# Patient Record
Sex: MALE | Race: WHITE | NOT HISPANIC OR LATINO | Employment: OTHER | URBAN - METROPOLITAN AREA
[De-identification: names, ages, dates, MRNs, and addresses within clinical notes are randomized per-mention and may not be internally consistent; named-entity substitution may affect disease eponyms.]

---

## 2019-09-28 ENCOUNTER — COMMUNICATION - HEALTHEAST (OUTPATIENT)
Dept: SCHEDULING | Facility: CLINIC | Age: 34
End: 2019-09-28

## 2021-06-01 NOTE — TELEPHONE ENCOUNTER
Pt calling that he sliced his left middle finer, with kitchen knife last evening and bleed, bleeding has stopped.  Pt wanting to know what he should do.  Pt states he is a [place] and [facility]    Felicia Flores RN, [facility] RN Triage Nurse Advisor      Reason for Disposition    [1] Deep cut AND [2] can see bone or tendons    Protocols used: CUTS AND [id number]

## 2021-12-02 ENCOUNTER — HOSPITAL ENCOUNTER (EMERGENCY)
Facility: HOSPITAL | Age: 36
Discharge: HOME | End: 2021-12-03
Attending: EMERGENCY MEDICINE | Admitting: EMERGENCY MEDICINE
Payer: COMMERCIAL

## 2021-12-02 VITALS
DIASTOLIC BLOOD PRESSURE: 74 MMHG | HEART RATE: 113 BPM | SYSTOLIC BLOOD PRESSURE: 131 MMHG | WEIGHT: 165 LBS | OXYGEN SATURATION: 98 % | RESPIRATION RATE: 20 BRPM | TEMPERATURE: 98.5 F

## 2021-12-02 DIAGNOSIS — F10.920 ALCOHOLIC INTOXICATION WITHOUT COMPLICATION (H): ICD-10-CM

## 2021-12-02 PROCEDURE — 99284 EMERGENCY DEPT VISIT MOD MDM: CPT

## 2021-12-02 RX ORDER — DIPHENHYDRAMINE HYDROCHLORIDE 50 MG/ML
25 INJECTION INTRAMUSCULAR; INTRAVENOUS ONCE
Status: DISCONTINUED | OUTPATIENT
Start: 2021-12-02 | End: 2021-12-03 | Stop reason: HOSPADM

## 2021-12-02 RX ORDER — LORAZEPAM 2 MG/ML
2 INJECTION INTRAMUSCULAR
Status: DISCONTINUED | OUTPATIENT
Start: 2021-12-02 | End: 2021-12-03 | Stop reason: HOSPADM

## 2021-12-02 RX ORDER — HALOPERIDOL 5 MG/ML
5 INJECTION INTRAMUSCULAR
Status: DISCONTINUED | OUTPATIENT
Start: 2021-12-02 | End: 2021-12-03 | Stop reason: HOSPADM

## 2021-12-03 NOTE — ED NOTES
[facility] detox [phone] will have a bed. ED staff is instructed to call back to secure a bed and give report at midnight.Pt 's Father Bandar [phone] updated re plan of care.Pt placed on a T hold.per provider.

## 2021-12-03 NOTE — ED NOTES
Bed: JNEDH-01  Expected date: 12/2/21  Expected time: 7:45 PM  Means of arrival: Ambulance  Comments:  Irene- 35 yo M ETOH
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
## 2021-12-03 NOTE — ED NOTES
Received report from Cathy. Patient is loud, using profanity and inappropriate. Gave sandwich and can of coke to try to assist in sobering up. Patient refusing vitals. Security presence at bedside.

## 2021-12-03 NOTE — ED NOTES
MD ok'd to discharge, offered cab to patient. Patient instead decided to call an uber. Security escorted him to waiting room to wait for his Uber.

## 2021-12-03 NOTE — ED PROVIDER NOTES
EMERGENCY DEPARTMENT ENCOUNTER     NAME: Mick Haq   AGE: 36 year old male   YOB: 1985   MRN: [id number]   EVALUATION DATE & TIME: 12/2/2021  7:46 PM   PCP: No primary care provider on file.     Chief Complaint   Patient presents with     Alcohol Intoxication   :    FINAL IMPRESSION       1. Alcoholic intoxication without complication (H)           ED COURSE & MEDICAL DECISION MAKING      Pertinent Labs & Imaging studies reviewed. (See chart for details)   36 year old male  presents to the Emergency Department for evaluation of alcohol intoxication. Initial Vitals Reviewed. Initial exam notable for patient who smells of alcohol, is intoxicated and only oriented to self, but is otherwise alert, awake, and admits to drinking a large amount of alcohol today.  He denies any other medical concerns.  Per EMS, he blew a 0.296 which is consistent with alcohol intoxication.  He was able to give us a phone number for his father, and unfortunately his dad is in [place] and would not be able to come pick him up.  Therefore, he will be transferred to [facility] for the evening.        8:04 PM I met the patient and performed my initial interview and exam.     At the conclusion of the encounter I discussed the results of all of the tests and the disposition. The questions were answered. The patient or family acknowledged understanding and was agreeable with the care plan.         MEDICATIONS GIVEN IN THE EMERGENCY:   Medications - No data to display   NEW PRESCRIPTIONS STARTED AT TODAY'S ER VISIT   New Prescriptions    No medications on file     ================================================================   HISTORY OF PRESENT ILLNESS       Patient information was obtained from: patient    Use of Intrepreter: N/A   Mick Haq is a 36 year old male with no medical history on file who presents for evaluation of intoxication.     History limited secondary to patient intoxication.     Patient  "reports he was quote \"trying to be good\". He does not know where he is or how he got to the ED.     He blew a 0.296 for EMS.     ================================================================    REVIEW OF SYSTEMS       Review of Systems   Unable to perform ROS: Other (Alcohol Intoxication)        PAST HISTORY     PAST MEDICAL HISTORY:   History reviewed. No pertinent past medical history.   PAST SURGICAL HISTORY:   History reviewed. No pertinent surgical history.   CURRENT MEDICATIONS:   No current outpatient medications on file.    ALLERGIES:   No Known Allergies   FAMILY HISTORY:   History reviewed. No pertinent family history.   SOCIAL HISTORY:   Social History     Socioeconomic History     Marital status: Not on file     Spouse name: Not on file     Number of children: Not on file     Years of education: Not on file     Highest education level: Not on file   Occupational History     Not on file   Tobacco Use     Smoking status: Not on file     Smokeless tobacco: Not on file   Substance and Sexual Activity     Alcohol use: Not on file     Drug use: Not on file     Sexual activity: Not on file   Other Topics Concern     Not on file   Social History Narrative     Not on file     Social Determinants of Health     Financial Resource Strain: Not on file   Food Insecurity: Not on file   Transportation Needs: Not on file   Physical Activity: Not on file   Stress: Not on file   Social Connections: Not on file   Intimate Partner Violence: Not on file   Housing Stability: Not on file        VITALS  Patient Vitals for the past 24 hrs:   BP Temp Temp src Pulse Resp SpO2 Weight   12/02/21 1950 131/74 98.5  F (36.9  C) Oral 113 20 98 % 74.8 kg (165 lb)        ================================================================    PHYSICAL EXAM     VITAL SIGNS: /74   Pulse 113   Temp 98.5  F (36.9  C) (Oral)   Resp 20   Wt 74.8 kg (165 lb)   SpO2 98%    Constitutional:  Patient is intoxicated.   HENT:  Atraumatic, " oropharynx without exudate or erythema, membranes moist  Lymph:  No adenopathy  Eyes: EOM intact, PERRL, no injection  Neck: Supple  Respiratory:  Clear to auscultation bilaterally, no wheezes or crackles   Cardiovascular:  Regular rate and rhythm, single S1 and S2   GI:  Soft, nontender, nondistended, no rebound or guarding   Musculoskeletal:  Moves all extremities, no lower extremity edema, no deformities    Skin:  Warm, dry  Neurologic:  Alert, oriented only to self, no focal deficits noted       ================================================================  LAB       All pertinent labs reviewed and interpreted.   Labs Ordered and Resulted from Time of ED Arrival to Time of ED Departure - No data to display     ===============================================================  RADIOLOGY       Reviewed all pertinent imaging. Please see official radiology report.   No orders to display         ================================================================  EKG         I have independently reviewed and interpreted the EKG(s) documented above.     ================================================================  PROCEDURES         I, Keny Monae, am serving as a scribe to document services personally performed by Dr. Chilel based on my observation and the provider's statements to me. I, Barbara Chilel MD attest that Keny Monae is acting in a scribe capacity, has observed my performance of the services and has documented them in accordance with my direction.   Barbara Chilel M.D.   Emergency Medicine   [facility] EMERGENCY DEPARTMENT  [facility] [facility] [id number] [phone]  Dept: [phone]      Barbara Chilel MD  12/02/21 0108

## 2021-12-03 NOTE — ED PROVIDER NOTES
"EMERGENCY DEPARTMENT PROGRESS NOTE         ED COURSE AND MEDICAL DECISION MAKING  Patient was signed out to me by Dr. Barbara Chilel at 10:05 PM.      Mick Haq is a 36 year old male who presents with alcohol intoxication.    History limited secondary to patient intoxication.      Patient reports he was quote \"trying to be good\". He does not know where he is or how he got to the ED.      He blew a 0.296 for EMS.     Just prior to signout, the patient was starting to be a bit more confrontational with nursing staff so security was called to the bedside to help to verbally redirect the patient.  He does still appear intoxicated and is not able to leave unless he has a sober person come to collect him.  Plan currently is for him to go to [facility] detox on a transport hold.    12:21 AM patient does not wish to go to detox. He has been awake, intermittently standing and walking stable on his feet, and talking quite avidly with the security guards were present at patient bedside for the last 2-1/2 hours of my shift. He does seem to be clearing. He now is able to problem solve how he would get into his house, and does wish to be discharged. He is concerned about tomorrow morning and making class. He is planning to call in [facility] and will not drive home. Clinically he has sobered.  At this point he is able to make decisions for himself and does have decision-making capability and I do not feel that I can hold him against his will in the emergency department any longer. Patient was discharged at his request and was ambulatory out of the department.    Medications - No data to display  New Prescriptions    No medications on file       LAB  Pertinent labs results reviewed   [unfilled]      RADIOLOGY    Pertinent imaging reviewed   Please see official radiology report.  No orders to display       FINAL IMPRESSION    1. Alcoholic intoxication without complication (H)         I, Kate Abarca, am serving as a " scribe to document services personally performed by Dr. Triny Rodriguez, based on my observations and the provider's statements to me.    I, Dr. Rodriguez, attest that Kate Abarca is acting in a scribe capacity, has observed my performance of the services and has documented them in accordance with my direction.        Triny Rodriguez MD  12/02/21 2217       Triny Rodriguez MD  12/03/21 0022

## 2022-03-05 ENCOUNTER — HOSPITAL ENCOUNTER (EMERGENCY)
Facility: HOSPITAL | Age: 37
Discharge: HOME | End: 2022-03-05
Attending: STUDENT IN AN ORGANIZED HEALTH CARE EDUCATION/TRAINING PROGRAM | Admitting: STUDENT IN AN ORGANIZED HEALTH CARE EDUCATION/TRAINING PROGRAM
Payer: COMMERCIAL

## 2022-03-05 ENCOUNTER — APPOINTMENT (OUTPATIENT)
Dept: RADIOLOGY | Facility: HOSPITAL | Age: 37
End: 2022-03-05
Payer: COMMERCIAL

## 2022-03-05 VITALS
HEIGHT: 67 IN | BODY MASS INDEX: 24.33 KG/M2 | RESPIRATION RATE: 20 BRPM | DIASTOLIC BLOOD PRESSURE: 61 MMHG | SYSTOLIC BLOOD PRESSURE: 101 MMHG | OXYGEN SATURATION: 95 % | TEMPERATURE: 99.4 F | HEART RATE: 85 BPM | WEIGHT: 155 LBS

## 2022-03-05 DIAGNOSIS — R00.0 TACHYCARDIA: ICD-10-CM

## 2022-03-05 DIAGNOSIS — F10.929 ALCOHOL INTOXICATION (H): ICD-10-CM

## 2022-03-05 LAB
ANION GAP SERPL CALCULATED.3IONS-SCNC: 20 MMOL/L (ref 5–18)
BUN SERPL-MCNC: 9 MG/DL (ref 8–22)
CALCIUM SERPL-MCNC: 7.8 MG/DL (ref 8.5–10.5)
CHLORIDE BLD-SCNC: 105 MMOL/L (ref 98–107)
CO2 SERPL-SCNC: 18 MMOL/L (ref 22–31)
CREAT SERPL-MCNC: 0.68 MG/DL (ref 0.7–1.3)
ERYTHROCYTE [DISTWIDTH] IN BLOOD BY AUTOMATED COUNT: 13.1 % (ref 10–15)
ETHANOL SERPL-MCNC: 193 MG/DL
GFR SERPL CREATININE-BSD FRML MDRD: >90 ML/MIN/1.73M2
GLUCOSE BLD-MCNC: 87 MG/DL (ref 70–125)
HCT VFR BLD AUTO: 44.2 % (ref 40–53)
HGB BLD-MCNC: 14.8 G/DL (ref 13.3–17.7)
HOLD SPECIMEN: NORMAL
MCH RBC QN AUTO: 31.9 PG (ref 26.5–33)
MCHC RBC AUTO-ENTMCNC: 33.5 G/DL (ref 31.5–36.5)
MCV RBC AUTO: 95 FL (ref 78–100)
PLATELET # BLD AUTO: 277 10E3/UL (ref 150–450)
POTASSIUM BLD-SCNC: 3.8 MMOL/L (ref 3.5–5)
RBC # BLD AUTO: 4.64 10E6/UL (ref 4.4–5.9)
SODIUM SERPL-SCNC: 143 MMOL/L (ref 136–145)
TROPONIN I SERPL-MCNC: <0.01 NG/ML (ref 0–0.29)
TSH SERPL DL<=0.005 MIU/L-ACNC: 1.36 UIU/ML (ref 0.3–5)
WBC # BLD AUTO: 5 10E3/UL (ref 4–11)

## 2022-03-05 PROCEDURE — 85027 COMPLETE CBC AUTOMATED: CPT | Performed by: PHYSICIAN ASSISTANT

## 2022-03-05 PROCEDURE — 84484 ASSAY OF TROPONIN QUANT: CPT | Performed by: PHYSICIAN ASSISTANT

## 2022-03-05 PROCEDURE — 36415 COLL VENOUS BLD VENIPUNCTURE: CPT | Performed by: PHYSICIAN ASSISTANT

## 2022-03-05 PROCEDURE — 96360 HYDRATION IV INFUSION INIT: CPT

## 2022-03-05 PROCEDURE — 82310 ASSAY OF CALCIUM: CPT | Performed by: PHYSICIAN ASSISTANT

## 2022-03-05 PROCEDURE — 82077 ASSAY SPEC XCP UR&BREATH IA: CPT | Performed by: PHYSICIAN ASSISTANT

## 2022-03-05 PROCEDURE — 71046 X-RAY EXAM CHEST 2 VIEWS: CPT

## 2022-03-05 PROCEDURE — 93005 ELECTROCARDIOGRAM TRACING: CPT | Performed by: STUDENT IN AN ORGANIZED HEALTH CARE EDUCATION/TRAINING PROGRAM

## 2022-03-05 PROCEDURE — 99285 EMERGENCY DEPT VISIT HI MDM: CPT | Mod: 25

## 2022-03-05 PROCEDURE — 84443 ASSAY THYROID STIM HORMONE: CPT | Performed by: PHYSICIAN ASSISTANT

## 2022-03-05 PROCEDURE — 258N000003 HC RX IP 258 OP 636: Performed by: PHYSICIAN ASSISTANT

## 2022-03-05 RX ADMIN — SODIUM CHLORIDE 1000 ML: 9 INJECTION, SOLUTION INTRAVENOUS at 10:37

## 2022-03-05 ASSESSMENT — ENCOUNTER SYMPTOMS
PALPITATIONS: 1
DIZZINESS: 0
NAUSEA: 0
ABDOMINAL PAIN: 0
LIGHT-HEADEDNESS: 1
SHORTNESS OF BREATH: 0

## 2022-03-05 NOTE — DISCHARGE INSTRUCTIONS
Your labs are reassuring.  Make sure you are drinking plenty of water to stay hydrated.  Try to stay away from alcohol.    Follow up in clinic this week for re-check.  Return to the emergency department if you develop worsening heart racing/tachycardia, shortness of breath, chest pain, headaches, or any other concerning symptoms. We would be happy to see you.

## 2022-03-05 NOTE — ED PROVIDER NOTES
EMERGENCY DEPARTMENT ENCOUNTER      NAME: Mick Haq  AGE: 36 year old male  YOB: 1985  MRN: [id number]  EVALUATION DATE & TIME: 3/5/2022 10:11 AM    PCP: System, Provider Not In    ED PROVIDER: Nan Sweeney PA-C      Chief Complaint   Patient presents with     Tachycardia         FINAL IMPRESSION:  1. Tachycardia    2. Alcohol intoxication (H)          ED COURSE & MEDICAL DECISION MAKING:    10:17 AM I introduced myself to patient, performed initial HPI and examination.   1:18 PM Talked about results. Discussed plan for discharge.       36 year old male with PMH bipolar, cocaine dependence, amphetamine dependence presents to the Emergency Department for evaluation of tachycardia. Patient was sitting at home watching a movie, felt his heart rate and noticed it was persistently elevated for 2-3 hours, prompting EMS being called. Per EMS patient was 140s upon their arrival but in the , mostly 80s. Patient does report alcohol use prior to arrival. Reports daily marijuana but otherwise no drug use. Patient is not interested in detox or any resources at this time.     Patient does have slightly pressured speech, anxious appearing and frequently fidgeting or moving legs. Unsure if this is his bipolar history (although he denies any history of this, not on medicines) or if this is additional substance abuse (again with a history of cocaine and amphetamine use). Patient is stable however and cohesive with no evidence of acute psychosis requiring further mental health/psych evaluation.   No pleuritic chest pain or other symptoms/risk factors for pulmonary embolism. Tachycardia did rapidly improve, no hypoxia and pulmonary embolism highly unlikely. No further work-up obtained at this time. EKG with sinus tachycardia but no SVT, atrial fibrillation or other arrhythmia. No ischemic changes. Labs with no leukocytosis, no significant anemia. No electrolyte derangement. TSH WNL. Troponin is  negative. ETOH 193. CXR unremarkable.     Patient was given IV fluids, observed in the ED. Patient is stable, no evidence of withdrawal, persistently with normal HR and vitals. At this time work up is reassuring. Encouraged PO hydration, cessation of alcohol, close follow up with PCP and red flags/indications to return to the emergency department. All questions were answered to the best of my ability and patient is agreeable with plan.       MEDICATIONS GIVEN IN THE EMERGENCY:  Medications   0.9% sodium chloride BOLUS (1,000 mLs Intravenous New Bag 3/5/22 1037)       NEW PRESCRIPTIONS STARTED AT TODAY'S ER VISIT  [unfilled]       =================================================================    HPI    Patient information was obtained from: Patient    Use of : N/A        Mick HERRING Eun is a 36 year old male with a pertinent history of alcohol withdrawal who presents to this ED via private car for evaluation of tachycardia.     Patient reports feeling his heart beating abnormally fast for the past 3-4 hours(~0700), adding that he does not have any chest pain or shortness of breath. He endorses lightheadedness but nothing else. No history of blood clots. No recent travel or hospitalizations. No history of diabetes, arrhythmias, heart disease, thyroid abnormalities. Patient reports daily marijuana and alcohol use. Endorses experiencing alcohol withdrawals typically 12 hours after cessation of alcohol. No other drug use. No dizziness, no recent falls. No syncope. No abdominal pain, nausea, leg pain or swelling, or any other symptoms at this time.      REVIEW OF SYSTEMS   Review of Systems   Respiratory: Negative for shortness of breath.    Cardiovascular: Positive for palpitations. Negative for chest pain and leg swelling.   Gastrointestinal: Negative for abdominal pain and nausea.   Neurological: Positive for light-headedness. Negative for dizziness and syncope.   All other systems reviewed and  "are negative.      PAST MEDICAL HISTORY:  History reviewed. No pertinent past medical history.    PAST SURGICAL HISTORY:  History reviewed. No pertinent surgical history.    CURRENT MEDICATIONS:    No current outpatient medications on file.      ALLERGIES:  Allergies   Allergen Reactions     Lactose        FAMILY HISTORY:  No family history on file.    SOCIAL HISTORY:   Social History     Socioeconomic History     Marital status: Single     Spouse name: Not on file     Number of children: Not on file     Years of education: Not on file     Highest education level: Not on file   Occupational History     Not on file   Tobacco Use     Smoking status: Not on file     Smokeless tobacco: Not on file   Substance and Sexual Activity     Alcohol use: Yes     Drug use: Not on file     Sexual activity: Not on file   Other Topics Concern     Not on file   Social History Narrative     Not on file     Social Determinants of Health     Financial Resource Strain: Not on file   Food Insecurity: Not on file   Transportation Needs: Not on file   Physical Activity: Not on file   Stress: Not on file   Social Connections: Not on file   Intimate Partner Violence: Not on file   Housing Stability: Not on file       VITALS:  /55   Pulse 96   Temp 99.2  F (37.3  C) (Oral)   Resp 23   Ht 1.702 m (5' 7\")   Wt 70.3 kg (155 lb)   SpO2 97%   BMI 24.28 kg/m      PHYSICAL EXAM    Constitutional: Well developed, Well nourished, NAD, GCS 15   HENT: Normocephalic, Atraumatic, Oropharynx clear.   Neck- Supple, Nontender. Normal ROM. No stridor.  Eyes: Conjunctiva normal. PERRL. EOM intact.   Respiratory: No respiratory distress, speaking in full sentences. Normal breath sounds, No wheezing  Cardiovascular: Normal heart rate, Regular rhythm, No murmurs. Chest wall nontender.    GI: Soft, nontender.   Musculoskeletal: No deformities, Moves all extremities equally. No calf tenderness or swelling.  Integument: Warm, Dry, No erythema, " ecchymosis, or rash.   Neurologic: Alert & oriented x 3, Normal sensory function. No focal deficits.   Psychiatric: Affect normal. Slightly pressured speech, fidgety and frequently moving his legs.      LAB:  All pertinent labs reviewed and interpreted.  Results for orders placed or performed during the hospital encounter of 03/05/22   CBC (+ platelets, no diff)   Result Value Ref Range    WBC Count 5.0 4.0 - 11.0 10e3/uL    RBC Count 4.64 4.40 - 5.90 10e6/uL    Hemoglobin 14.8 13.3 - 17.7 g/dL    Hematocrit 44.2 40.0 - 53.0 %    MCV 95 78 - 100 fL    MCH 31.9 26.5 - 33.0 pg    MCHC 33.5 31.5 - 36.5 g/dL    RDW 13.1 10.0 - 15.0 %    Platelet Count 277 150 - 450 10e3/uL   Basic metabolic panel   Result Value Ref Range    Sodium 143 136 - 145 mmol/L    Potassium 3.8 3.5 - 5.0 mmol/L    Chloride 105 98 - 107 mmol/L    Carbon Dioxide (CO2) 18 (L) 22 - 31 mmol/L    Anion Gap 20 (H) 5 - 18 mmol/L    Urea Nitrogen 9 8 - 22 mg/dL    Creatinine 0.68 (L) 0.70 - 1.30 mg/dL    Calcium 7.8 (L) 8.5 - 10.5 mg/dL    Glucose 87 70 - 125 mg/dL    GFR Estimate >90 >60 mL/min/1.73m2   Troponin I (now)   Result Value Ref Range    Troponin I <0.01 0.00 - 0.29 ng/mL   Alcohol level blood   Result Value Ref Range    Alcohol, Blood 193 (H) None detected mg/dL       RADIOLOGY:  Reviewed all pertinent imaging. Please see official radiology report.  Chest XR,  PA & LAT    (Results Pending)       EKG:    Performed at: 10:18:23    Impression: Sinus tachycardia    Rate: 104 BPM  AR Interval: 124 ms  QRS Interval: 80 ms  QTc Interval: 342/449 ms  ST Changes: None  Comparison: None    Dr. Pelayo and JENNIFFER have independently reviewed and interpreted the EKG(s) documented above.    PROCEDURES:   None      JENNIFFER, Ricco Nobles, am serving as a scribe to document services personally performed by Nan Sweeney PA-C based on my observation and the provider's statements to me. I, Nan Sweeney PA-C, attest that Ricco Nobles is acting in a scribe  capacity, has observed my performance of the services and has documented them in accordance with my direction.    Nan Sweeney PA-C  Emergency Medicine  [facility] EMERGENCY DEPARTMENT  [facility] [facility] [id number] [phone]             Nan Sweeney PA-C  03/05/22 3526

## 2022-03-05 NOTE — ED NOTES
Bed: JNED-08  Expected date: 3/5/22  Expected time:   Means of arrival: Ambulance  Comments:  Scott  36 M  tachycardia

## 2022-03-05 NOTE — ED TRIAGE NOTES
"Patient called EMS due to feeling heart racing, checked pulse and tachycardic.  Did drink alcohol last night into the morning.  HR 140s on arrival per EMS improved to 120s-130s.  VSS otherwise.  Pt reports feeling \"fuzzy\" cognitiviely on arrival, speaking with ease and makes conversation, pleasant.  No other pain or concerns. Smokes marijuana daily.   "

## 2022-03-05 NOTE — ED NOTES
Patient ready for discharge, has dressed self steady on feet and removed monitoring, good spirit and makes conversation.  Relaxed and reports feeling better, plans to go eat, plans to meet up with friend who will drive.  No chest pain, tachycardia or JETT reported with activity, no fogginess reported.  States understanding to discharge instructions with no further questions or needs.  All belongings sent at exit. Thanks for care provided.

## 2022-03-06 LAB
ATRIAL RATE - MUSE: 104 BPM
DIASTOLIC BLOOD PRESSURE - MUSE: 67 MMHG
INTERPRETATION ECG - MUSE: NORMAL
P AXIS - MUSE: 76 DEGREES
PR INTERVAL - MUSE: 124 MS
QRS DURATION - MUSE: 80 MS
QT - MUSE: 342 MS
QTC - MUSE: 449 MS
R AXIS - MUSE: 83 DEGREES
SYSTOLIC BLOOD PRESSURE - MUSE: 137 MMHG
T AXIS - MUSE: 49 DEGREES
VENTRICULAR RATE- MUSE: 104 BPM

## 2022-03-13 ENCOUNTER — HOSPITAL ENCOUNTER (EMERGENCY)
Facility: HOSPITAL | Age: 37
Discharge: HOME | End: 2022-03-13
Admitting: PHYSICIAN ASSISTANT
Payer: COMMERCIAL

## 2022-03-13 VITALS
DIASTOLIC BLOOD PRESSURE: 75 MMHG | BODY MASS INDEX: 24.28 KG/M2 | TEMPERATURE: 97.9 F | HEART RATE: 85 BPM | RESPIRATION RATE: 20 BRPM | OXYGEN SATURATION: 99 % | WEIGHT: 155 LBS | SYSTOLIC BLOOD PRESSURE: 112 MMHG

## 2022-03-13 DIAGNOSIS — F10.20 ALCOHOL DEPENDENCE (H): ICD-10-CM

## 2022-03-13 DIAGNOSIS — R00.0 TACHYCARDIA: ICD-10-CM

## 2022-03-13 PROBLEM — F10.10 NONDEPENDENT ALCOHOL ABUSE: Status: ACTIVE | Noted: 2022-03-13

## 2022-03-13 PROBLEM — F12.20 CANNABIS DEPENDENCE (H): Status: ACTIVE | Noted: 2022-03-13

## 2022-03-13 PROBLEM — F15.20 AMPHETAMINE AND PSYCHOSTIMULANT DEPENDENCE (H): Status: ACTIVE | Noted: 2022-03-13

## 2022-03-13 PROBLEM — F31.9 BIPOLAR DISORDER (H): Status: ACTIVE | Noted: 2022-03-13

## 2022-03-13 PROBLEM — F14.20 COCAINE DEPENDENCE (H): Status: ACTIVE | Noted: 2022-03-13

## 2022-03-13 LAB
ANION GAP SERPL CALCULATED.3IONS-SCNC: 9 MMOL/L (ref 5–18)
BUN SERPL-MCNC: 11 MG/DL (ref 8–22)
CALCIUM SERPL-MCNC: 8.3 MG/DL (ref 8.5–10.5)
CHLORIDE BLD-SCNC: 110 MMOL/L (ref 98–107)
CO2 SERPL-SCNC: 25 MMOL/L (ref 22–31)
CREAT SERPL-MCNC: 0.78 MG/DL (ref 0.7–1.3)
ERYTHROCYTE [DISTWIDTH] IN BLOOD BY AUTOMATED COUNT: 13.3 % (ref 10–15)
GFR SERPL CREATININE-BSD FRML MDRD: >90 ML/MIN/1.73M2
GLUCOSE BLD-MCNC: 93 MG/DL (ref 70–125)
HCT VFR BLD AUTO: 42.1 % (ref 40–53)
HGB BLD-MCNC: 14.1 G/DL (ref 13.3–17.7)
MCH RBC QN AUTO: 32.2 PG (ref 26.5–33)
MCHC RBC AUTO-ENTMCNC: 33.5 G/DL (ref 31.5–36.5)
MCV RBC AUTO: 96 FL (ref 78–100)
PLATELET # BLD AUTO: 223 10E3/UL (ref 150–450)
POTASSIUM BLD-SCNC: 3.9 MMOL/L (ref 3.5–5)
RBC # BLD AUTO: 4.38 10E6/UL (ref 4.4–5.9)
SODIUM SERPL-SCNC: 144 MMOL/L (ref 136–145)
TROPONIN I SERPL-MCNC: <0.01 NG/ML (ref 0–0.29)
WBC # BLD AUTO: 3.3 10E3/UL (ref 4–11)

## 2022-03-13 PROCEDURE — 250N000011 HC RX IP 250 OP 636: Performed by: PHYSICIAN ASSISTANT

## 2022-03-13 PROCEDURE — 36415 COLL VENOUS BLD VENIPUNCTURE: CPT | Performed by: PHYSICIAN ASSISTANT

## 2022-03-13 PROCEDURE — 93005 ELECTROCARDIOGRAM TRACING: CPT | Performed by: STUDENT IN AN ORGANIZED HEALTH CARE EDUCATION/TRAINING PROGRAM

## 2022-03-13 PROCEDURE — 99285 EMERGENCY DEPT VISIT HI MDM: CPT | Mod: 25

## 2022-03-13 PROCEDURE — 96361 HYDRATE IV INFUSION ADD-ON: CPT

## 2022-03-13 PROCEDURE — 96374 THER/PROPH/DIAG INJ IV PUSH: CPT

## 2022-03-13 PROCEDURE — 85027 COMPLETE CBC AUTOMATED: CPT | Performed by: PHYSICIAN ASSISTANT

## 2022-03-13 PROCEDURE — 82310 ASSAY OF CALCIUM: CPT | Performed by: PHYSICIAN ASSISTANT

## 2022-03-13 PROCEDURE — 84484 ASSAY OF TROPONIN QUANT: CPT | Performed by: PHYSICIAN ASSISTANT

## 2022-03-13 PROCEDURE — 258N000003 HC RX IP 258 OP 636: Performed by: PHYSICIAN ASSISTANT

## 2022-03-13 RX ORDER — DIAZEPAM 10 MG/2ML
10 INJECTION, SOLUTION INTRAMUSCULAR; INTRAVENOUS ONCE
Status: COMPLETED | OUTPATIENT
Start: 2022-03-13 | End: 2022-03-13

## 2022-03-13 RX ADMIN — SODIUM CHLORIDE 1000 ML: 9 INJECTION, SOLUTION INTRAVENOUS at 10:17

## 2022-03-13 RX ADMIN — DIAZEPAM 10 MG: 5 INJECTION, SOLUTION INTRAMUSCULAR; INTRAVENOUS at 10:17

## 2022-03-13 ASSESSMENT — ENCOUNTER SYMPTOMS
LIGHT-HEADEDNESS: 1
NAUSEA: 0
PALPITATIONS: 1
CHILLS: 0
FEVER: 0

## 2022-03-13 NOTE — ED TRIAGE NOTES
Patient arrives with concerns for his heart rate. Patient states he woke up and felt his heart racing. States he was here last week for the same concerns. Patient thinks this may be due to alcohol use and marijuana use. States he took 10-14 shots of alcohol last night around 2100.

## 2022-03-13 NOTE — ED NOTES
"While reviewing discharge paperwork, pt asked if the provider \"has anymore of that Valium for me? That stuff is great. It doesn't really make you feel high, but it makes everything feel okay. What dose was that?\" RN educated pt he received 10 mg IV Valium and that he was not being discharged with a prescription. Pt stated, \"Good to know, I'll keep that dose in mind.\" RN discussed the dangers of obtaining medications off the street and especially mixing them with alcohol. Pt laughed and said with a sarcastic tone, \"No, I would never do that.\" Pt then talked about how he used to \"chew a Xanax bar and drink a 6 pack,\" but he had to quit doing that because \"the [employer] frowned on that.\" [facility]  "

## 2022-03-13 NOTE — DISCHARGE INSTRUCTIONS
Your labs are reassuring.  Your heart rate is improved.  Continue to try to stay away from alcohol.     I have placed a referral for follow up with our rapid access cardiology clinic. They will probably want to do a holter/cardiac monitor to try to catch these episodes of tachycardia/fast heart rate. They should be calling you tomorrow to schedule follow up.     Make sure you are drinking plenty of water to stay hydrated.  Avoid caffeine and smoking.     Return to the emergency department if you develop any new/worsening symptoms. We would be happy to see you.

## 2022-03-13 NOTE — ED PROVIDER NOTES
EMERGENCY DEPARTMENT ENCOUNTER      NAME: Mick Haq  AGE: 36 year old male  YOB: 1985  MRN: [id number]  EVALUATION DATE & TIME: No admission date for patient encounter.    PCP: No Ref-Primary, Physician    ED PROVIDER: aNn Sweeney PA-C      Chief Complaint   Patient presents with     Tachycardia     Alcohol Problem         FINAL IMPRESSION:  1. Tachycardia    2. Alcohol dependence (H)          ED COURSE & MEDICAL DECISION MAKIN:50 AM I introduced myself to patient, performed initial HPI and examination.   11:20 AM Discussed plan for discharge.     36 year old male with PMH bipolar disorder, alcohol dependence presents to the Emergency Department for evaluation of tachycardia.  Patient reports he woke up this morning, heart rates in the 140s for about an hour prompting evaluation.  Does report some lightheadedness, intermittent chest pain.  No shortness of breath.  No other associated symptoms.  Last drink was at approximately 10 PM last night.  Patient does feel some early withdrawal signs.  No history of alcohol withdrawal seizures.    Patient was seen by me 3/5/22 (last week) for similar symptoms, work-up was unremarkable and patient was up to mentally discharge.  Patient states she he had not had any other episodes until today.    Upon arrival, patient is mildly tachycardic with this resolved at time of examination.  EKG with normal sinus rhythm.  Labs including CBC, BMP and troponin are negative.    At this time, no evidence of atrial fibrillation, supraventricular tachycardia, or any other arrhythmias.  Work-up is reassuring.  Patient is well-appearing, does report some early withdrawal symptoms but examination grossly unremarkable.  Does not appear acutely intoxicated.    Patient was given IV fluids, Valium.  Reassessed, HR remains <100 bpm in the ED. plan for discharge.  Patient already has established with [facility] to try for alcohol cessation.  Patient was offered  detox, but declines.  Given that this is a second episode in a little over a week with noted heart rates in the 140s, plan for referral for rapid access cardiology clinic to discuss cardiac monitoring and further evaluation.  [facility] does [facility].  Instructed on at home management, close follow-up, and red flags/locations to return to the emergency department.  All questions were answered the best my ability patient is agreeable with plan.        MEDICATIONS GIVEN IN THE EMERGENCY:  Medications   0.9% sodium chloride BOLUS (1,000 mLs Intravenous New Bag 3/13/22 1017)   diazepam (VALIUM) injection 10 mg (10 mg Intravenous Given 3/13/22 1017)       NEW PRESCRIPTIONS STARTED AT TODAY'S ER VISIT  [unfilled]       =================================================================    HPI    Patient information was obtained from: the patient     Use of : N/A         Mick Haq is a 36 year old male with a pertinent history of alcohol abuse who presents to this ED via walk in for evaluation of tachycardia and an alcohol problem.     Patient reports he woke up this morning and heart was racing. Checked his heart rate and he says it was 130-140s for approximately 1 hour. Feels intermittent chest pain which he states feels more like muscle pain. Intermittent lightheadedness. No fevers,chills, nausea.     Patient does report heavy ETOH use and dependence. Last drink at approximately 10pm last night, none this morning. Reports he does feel like he is starting withdrawal, feels itchy. Has felt like this for approximately 3 hours. No history of ETOH withdrawal.    Has previously done AA. Has a calendar which he checks off every day he is sober. Signed up for [facility] yesterday.         REVIEW OF SYSTEMS   Review of Systems   Constitutional: Negative for chills and fever.        Positive for alcohol use and feeling itchy    Cardiovascular: Positive for chest pain (intermittent) and palpitations.   Gastrointestinal: Negative for nausea.   Neurological: Positive for light-headedness (intermittent).   All other systems reviewed and are negative.      PAST MEDICAL HISTORY:  History reviewed. No pertinent past medical history.    PAST SURGICAL HISTORY:  History reviewed. No pertinent surgical history.    CURRENT MEDICATIONS:    No current outpatient medications on file.      ALLERGIES:  Allergies   Allergen Reactions     Lactose        FAMILY HISTORY:  History reviewed. No pertinent family history.    SOCIAL HISTORY:   Social History     Socioeconomic History     Marital status: Single     Spouse name: Not on file     Number of children: Not on file     Years of education: Not on file     Highest education level: Not on file   Occupational History     Not on file   Tobacco Use     Smoking status: Current Every Day Smoker     Smokeless tobacco: Not on file   Substance and Sexual Activity     Alcohol use: Yes     Drug use: Yes     Types: Marijuana     Sexual activity: Not on file   Other Topics Concern     Not on file   Social History Narrative     Not on file     Social Determinants of Health     Financial Resource Strain: Not on file   Food Insecurity: Not on file   Transportation Needs: Not on file   Physical Activity: Not on file   Stress: Not on file   Social Connections: Not on file   Intimate Partner Violence: Not on file   Housing Stability: Not on file       VITALS:  /63   Pulse 75   Temp 97.9  F (36.6  C) (Temporal)   Resp 18   Wt 70.3 kg (155 lb)   SpO2 99%   BMI 24.28 kg/m      PHYSICAL EXAM    Constitutional: Well developed, Well nourished, NAD, GCS 15   HENT: Normocephalic, Atraumatic, Oropharynx clear.   Neck- Supple, Nontender. Normal ROM. No stridor.  Eyes: Conjunctiva normal.   Respiratory: No respiratory distress, speaking in full sentences. Normal breath sounds, No wheezing, No cough  Cardiovascular: Normal  heart rate, Regular rhythm, No murmurs.    GI: Soft, nontender.    Musculoskeletal: No deformities, Moves all extremities equally. No calf tenderness or swelling.  Integument: Warm, Dry, No erythema, ecchymosis, or rash.  Neurologic: Alert & oriented x 3, Normal sensory function. No focal deficits.   Psychiatric: Affect normal, Judgment normal, Mood normal. Cooperative. No tremor.      LAB:  All pertinent labs reviewed and interpreted.  Results for orders placed or performed during the hospital encounter of 03/13/22   CBC (+ platelets, no diff)   Result Value Ref Range    WBC Count 3.3 (L) 4.0 - 11.0 10e3/uL    RBC Count 4.38 (L) 4.40 - 5.90 10e6/uL    Hemoglobin 14.1 13.3 - 17.7 g/dL    Hematocrit 42.1 40.0 - 53.0 %    MCV 96 78 - 100 fL    MCH 32.2 26.5 - 33.0 pg    MCHC 33.5 31.5 - 36.5 g/dL    RDW 13.3 10.0 - 15.0 %    Platelet Count 223 150 - 450 10e3/uL   Basic metabolic panel   Result Value Ref Range    Sodium 144 136 - 145 mmol/L    Potassium 3.9 3.5 - 5.0 mmol/L    Chloride 110 (H) 98 - 107 mmol/L    Carbon Dioxide (CO2) 25 22 - 31 mmol/L    Anion Gap 9 5 - 18 mmol/L    Urea Nitrogen 11 8 - 22 mg/dL    Creatinine 0.78 0.70 - 1.30 mg/dL    Calcium 8.3 (L) 8.5 - 10.5 mg/dL    Glucose 93 70 - 125 mg/dL    GFR Estimate >90 >60 mL/min/1.73m2   Troponin I (now)   Result Value Ref Range    Troponin I <0.01 0.00 - 0.29 ng/mL       RADIOLOGY:  Reviewed all pertinent imaging. Please see official radiology report.  No orders to display       EKG:    Performed at: 09:47:23    Impression: Sinus rhythm, Normal sinus rhythm    Rate: 91 BPM  WV Interval: 124 ms  QRS Interval: 82 ms  QTc Interval: 356/437 ms  ST Changes: None  Comparison: When compared with ECG of 05-Mar-2022 10:18 No significant change was found.     Dr. Kebede and I have independently reviewed and interpreted the EKG(s) documented above.    PROCEDURES:   None        Nan Sweeney PA-C  Emergency Medicine  [facility] EMERGENCY  DEPARTMENT  [address] [place] [id number]  [phone]              Nan Sweeney PARIVKA  03/13/22 1133

## 2022-03-15 ENCOUNTER — HOSPITAL ENCOUNTER (EMERGENCY)
Facility: HOSPITAL | Age: 37
Discharge: HOME | End: 2022-03-15
Attending: EMERGENCY MEDICINE | Admitting: EMERGENCY MEDICINE
Payer: COMMERCIAL

## 2022-03-15 VITALS
OXYGEN SATURATION: 97 % | TEMPERATURE: 98.3 F | RESPIRATION RATE: 25 BRPM | DIASTOLIC BLOOD PRESSURE: 57 MMHG | HEART RATE: 74 BPM | SYSTOLIC BLOOD PRESSURE: 110 MMHG

## 2022-03-15 DIAGNOSIS — F10.920 ALCOHOLIC INTOXICATION WITHOUT COMPLICATION (H): ICD-10-CM

## 2022-03-15 DIAGNOSIS — R00.2 PALPITATIONS: ICD-10-CM

## 2022-03-15 LAB
ALBUMIN SERPL-MCNC: 4.1 G/DL (ref 3.5–5)
ALP SERPL-CCNC: 67 U/L (ref 45–120)
ALT SERPL W P-5'-P-CCNC: 18 U/L (ref 0–45)
ANION GAP SERPL CALCULATED.3IONS-SCNC: 15 MMOL/L (ref 5–18)
AST SERPL W P-5'-P-CCNC: 24 U/L (ref 0–40)
BASOPHILS # BLD AUTO: 0 10E3/UL (ref 0–0.2)
BASOPHILS NFR BLD AUTO: 1 %
BILIRUB SERPL-MCNC: 0.2 MG/DL (ref 0–1)
BUN SERPL-MCNC: 9 MG/DL (ref 8–22)
CALCIUM SERPL-MCNC: 8.5 MG/DL (ref 8.5–10.5)
CHLORIDE BLD-SCNC: 107 MMOL/L (ref 98–107)
CO2 SERPL-SCNC: 23 MMOL/L (ref 22–31)
CREAT SERPL-MCNC: 0.82 MG/DL (ref 0.7–1.3)
EOSINOPHIL # BLD AUTO: 0.1 10E3/UL (ref 0–0.7)
EOSINOPHIL NFR BLD AUTO: 1 %
ERYTHROCYTE [DISTWIDTH] IN BLOOD BY AUTOMATED COUNT: 13.1 % (ref 10–15)
ETHANOL SERPL-MCNC: 250 MG/DL
GFR SERPL CREATININE-BSD FRML MDRD: >90 ML/MIN/1.73M2
GLUCOSE BLD-MCNC: 84 MG/DL (ref 70–125)
HCT VFR BLD AUTO: 45.7 % (ref 40–53)
HGB BLD-MCNC: 15.5 G/DL (ref 13.3–17.7)
IMM GRANULOCYTES # BLD: 0 10E3/UL
IMM GRANULOCYTES NFR BLD: 0 %
LYMPHOCYTES # BLD AUTO: 1.6 10E3/UL (ref 0.8–5.3)
LYMPHOCYTES NFR BLD AUTO: 29 %
MCH RBC QN AUTO: 32.2 PG (ref 26.5–33)
MCHC RBC AUTO-ENTMCNC: 33.9 G/DL (ref 31.5–36.5)
MCV RBC AUTO: 95 FL (ref 78–100)
MONOCYTES # BLD AUTO: 0.2 10E3/UL (ref 0–1.3)
MONOCYTES NFR BLD AUTO: 4 %
NEUTROPHILS # BLD AUTO: 3.5 10E3/UL (ref 1.6–8.3)
NEUTROPHILS NFR BLD AUTO: 65 %
NRBC # BLD AUTO: 0 10E3/UL
NRBC BLD AUTO-RTO: 0 /100
PLATELET # BLD AUTO: 264 10E3/UL (ref 150–450)
POTASSIUM BLD-SCNC: 4.3 MMOL/L (ref 3.5–5)
PROT SERPL-MCNC: 7.4 G/DL (ref 6–8)
RBC # BLD AUTO: 4.82 10E6/UL (ref 4.4–5.9)
SODIUM SERPL-SCNC: 145 MMOL/L (ref 136–145)
TROPONIN I SERPL-MCNC: <0.01 NG/ML (ref 0–0.29)
TSH SERPL DL<=0.005 MIU/L-ACNC: 0.64 UIU/ML (ref 0.3–5)
WBC # BLD AUTO: 5.4 10E3/UL (ref 4–11)

## 2022-03-15 PROCEDURE — 96361 HYDRATE IV INFUSION ADD-ON: CPT

## 2022-03-15 PROCEDURE — 258N000003 HC RX IP 258 OP 636: Performed by: EMERGENCY MEDICINE

## 2022-03-15 PROCEDURE — 80051 ELECTROLYTE PANEL: CPT | Performed by: EMERGENCY MEDICINE

## 2022-03-15 PROCEDURE — 85025 COMPLETE CBC W/AUTO DIFF WBC: CPT | Performed by: EMERGENCY MEDICINE

## 2022-03-15 PROCEDURE — 84443 ASSAY THYROID STIM HORMONE: CPT | Performed by: EMERGENCY MEDICINE

## 2022-03-15 PROCEDURE — 82077 ASSAY SPEC XCP UR&BREATH IA: CPT | Performed by: EMERGENCY MEDICINE

## 2022-03-15 PROCEDURE — 93005 ELECTROCARDIOGRAM TRACING: CPT | Performed by: EMERGENCY MEDICINE

## 2022-03-15 PROCEDURE — 96360 HYDRATION IV INFUSION INIT: CPT

## 2022-03-15 PROCEDURE — 99284 EMERGENCY DEPT VISIT MOD MDM: CPT | Mod: 25

## 2022-03-15 PROCEDURE — 84484 ASSAY OF TROPONIN QUANT: CPT | Performed by: EMERGENCY MEDICINE

## 2022-03-15 PROCEDURE — 36415 COLL VENOUS BLD VENIPUNCTURE: CPT | Performed by: EMERGENCY MEDICINE

## 2022-03-15 RX ADMIN — SODIUM CHLORIDE 1000 ML: 9 INJECTION, SOLUTION INTRAVENOUS at 15:32

## 2022-03-15 NOTE — ED NOTES
Bed: JNED-07  Expected date: 3/15/22  Expected time: 1:52 PM  Means of arrival: Ambulance  Comments:  [place] 35 yo M ETOH Anxiety

## 2022-03-15 NOTE — ED TRIAGE NOTES
"Pt arrives via [facility] after pt called due to them thinking their HR was in the 160's. EMS states it has been between 110-130 sinus tach en route. Pt is daily etoh drinker and admits to drinking a liter of etoh starting last night and finishing it this morning at around 0900. Pt endorses hx of anxiety but denies any medications for this. Pt seeking help for sober help, when asked why they have been binging lately they state \"I need a therapist to talk about that shit\", pt denies si/hi. Pt able to ambulate with ems with no gait disturbances.  "

## 2022-03-15 NOTE — DISCHARGE INSTRUCTIONS
Your evaluation here looks stable and reassuring and this included several hours of cardiac monitoring, an EKG, and blood testing related to the heart.  We do think your symptoms are probably at least in part related to your heavy alcohol use today.  Please drink plenty of liquids to keep yourself well-hydrated.  Follow-up in clinic, you can contact your primary provider using the information above, try to refrain from alcohol use and you can discuss cessation strategies in clinic.

## 2022-03-15 NOTE — ED PROVIDER NOTES
EMERGENCY DEPARTMENT ENCOUNTER      NAME: Mick Haq  AGE: 36 year old male  YOB: 1985  MRN: [id number]  EVALUATION DATE & TIME: 3/15/2022  2:00 PM    PCP: No Ref-Primary, Physician    ED PROVIDER: Willard Chance M.D.      Chief Complaint   Patient presents with     Alcohol Intoxication         FINAL IMPRESSION:  1. Alcoholic intoxication without complication (H)          ED COURSE & MEDICAL DECISION MAKING:    Pertinent Labs & Imaging studies reviewed. (See chart for details)  36 year old male presents to the Emergency Department for evaluation of tachycardia, alcohol use. Patient appears non toxic with stable vitals signs, patient afebrile with no tachycardia on my exam, breathing comfortably with no hypoxia. Overall exam is benign.  Lungs are clear and abdomen is benign, patient has no outward signs of trauma denies any falls or trauma.  Denies any history of withdrawal seizures or delirium tremens.  Denies any homicidal or suicidal ideation.  Offered evaluation by crisis  but the patient deferred, reviewed the medical record shows patient already has some type of alcohol reduction program in place, when I asked the patient he states that [facility]  Again no tachycardia here, we will obtain ECG and screening labs including TSH though low suspicion for endocrine dysfunction, no chest pain to suggest ACS, PE or dissection, no fever or symptoms to suggest sepsis or other infectious process.    Reassessment: ECG showed no acute concerning findings, at time of this dictation laboratory studies were pending.  Final disposition will be dependent upon the pending studies and the patient's clinical trajectory.  Patient was signed out to the [facility] afternoon physician.    2:45 PM I met with the patient, obtained history, performed an initial exam, and discussed options and plan for diagnostics and treatment here in the ED.     At the conclusion of the  encounter I discussed the results of all of the tests and the disposition. The questions were answered and return precautions provided. The patient or family acknowledged understanding and was agreeable with the care plan.         MEDICATIONS GIVEN IN THE EMERGENCY:  Medications   0.9% sodium chloride BOLUS (1,000 mLs Intravenous New Bag 3/15/22 6051)       NEW PRESCRIPTIONS STARTED AT TODAY'S ER VISIT  New Prescriptions    No medications on file            =================================================================    HPI    Patient information was obtained from: Patient    Use of Intrepreter: N/A         Mick Haq is a 36 year old male with a history of anxiety, multi-substance dependence, and nondependent alcohol abuse, who presents with palpitations.    Patient reports his heart has been racing since 0830 today, he had woken up 10 minutes prior and had not been exercising. He denies any other symptoms like diaphoresis, vomiting, abdominal pain, or bowel or bladder changes. Patient endorses excessive amounts of alcohol daily, last intake was between 8301-6950 today. He endorses tobacco and marijuana use. Patient denies any recent falls or head trauma. He denies thoughts of hurting self or others. He reports his heart is still racing faster than it should be, but not as fast as earlier. Patient states he was here a week ago and they wanted to put him on a Holter monitor. He endorses he generally has a lot of anxiety, but would not like medication for it at the moment. Patient denies any other current complaints.      REVIEW OF SYSTEMS   Constitutional:  Denies fever, chills  Respiratory:  Denies productive cough or increased work of breathing  Cardiovascular:  Denies chest pain. Positive for palpitations  GI:  Denies abdominal pain, nausea, vomiting, or change in bowel or bladder habits   Musculoskeletal:  Denies any new muscle/joint swelling  Skin:  Denies rash   Neurologic:  Denies focal  weakness  All systems negative except as marked.     PAST MEDICAL HISTORY:  No past medical history on file.    PAST SURGICAL HISTORY:  No past surgical history on file.      CURRENT MEDICATIONS:    Prior to Admission medications    Not on File        ALLERGIES:  Allergies   Allergen Reactions     Lactose        FAMILY HISTORY:  No family history on file.    SOCIAL HISTORY:   Social History     Socioeconomic History     Marital status: Single     Spouse name: Not on file     Number of children: Not on file     Years of education: Not on file     Highest education level: Not on file   Occupational History     Not on file   Tobacco Use     Smoking status: Current Every Day Smoker     Smokeless tobacco: Not on file   Substance and Sexual Activity     Alcohol use: Yes     Drug use: Yes     Types: Marijuana     Sexual activity: Not on file   Other Topics Concern     Not on file   Social History Narrative     Not on file     Social Determinants of Health     Financial Resource Strain: Not on file   Food Insecurity: Not on file   Transportation Needs: Not on file   Physical Activity: Not on file   Stress: Not on file   Social Connections: Not on file   Intimate Partner Violence: Not on file   Housing Stability: Not on file       VITALS:  Patient Vitals for the past 24 hrs:   BP Temp Temp src Pulse Resp SpO2   03/15/22 1600 110/57 -- -- 74 -- 97 %   03/15/22 1500 113/63 -- -- 82 25 97 %   03/15/22 1445 101/55 -- -- 76 20 97 %   03/15/22 1440 -- -- -- 76 21 96 %   03/15/22 1435 -- -- -- 74 21 96 %   03/15/22 1430 108/59 -- -- 82 20 96 %   03/15/22 1403 132/77 98.3  F (36.8  C) Oral 87 18 96 %        PHYSICAL EXAM    Constitutional:  Awake, alert, in no apparent distress  HENT:  Normocephalic, Atraumatic with no scalp hematomas or skull depressions, no signs of basilar skull injury, Bilateral external ears normal with no blood behind the TMs, Oropharynx moist with no signs of acute dental trauma, Nose normal with no septal  hematoma. Neck- Normal range of motion with no guarding, No midline cervical tenderness, Supple, No stridor.   Eyes:  PERRL, EOMI with no signs of entrapment, Conjunctiva normal, No discharge.   Respiratory:  Normal breath sounds, No respiratory distress, No wheezing.  No signs of flail chest  Cardiovascular:  Normal heart rate, Normal rhythm, No appreciable rubs or gallops.   GI:  Soft, No tenderness, No distension, No palpable masses  Musculoskeletal:  Intact distal pulses, No edema. Good range of motion in all major joints. No tenderness to palpation or major deformities noted.  Back-nontender along midline cervical, thoracic and lumbar spine with no step-offs or signs of trauma.  Pelvis is stable.  Integument:  Warm, Dry, No erythema, No rash.   Neurologic:  Alert & oriented, Normal motor function, Normal sensory function, No focal deficits noted.   Psychiatric:  Affect normal, Judgment normal, Mood normal.     LAB:  All pertinent labs reviewed and interpreted.  Results for orders placed or performed during the hospital encounter of 03/15/22   Comprehensive metabolic panel   Result Value Ref Range    Sodium 145 136 - 145 mmol/L    Potassium 4.3 3.5 - 5.0 mmol/L    Chloride 107 98 - 107 mmol/L    Carbon Dioxide (CO2) 23 22 - 31 mmol/L    Anion Gap 15 5 - 18 mmol/L    Urea Nitrogen 9 8 - 22 mg/dL    Creatinine 0.82 0.70 - 1.30 mg/dL    Calcium 8.5 8.5 - 10.5 mg/dL    Glucose 84 70 - 125 mg/dL    Alkaline Phosphatase 67 45 - 120 U/L    AST 24 0 - 40 U/L    ALT 18 0 - 45 U/L    Protein Total 7.4 6.0 - 8.0 g/dL    Albumin 4.1 3.5 - 5.0 g/dL    Bilirubin Total 0.2 0.0 - 1.0 mg/dL    GFR Estimate >90 >60 mL/min/1.73m2   Result Value Ref Range    Troponin I <0.01 0.00 - 0.29 ng/mL   TSH with free T4 reflex   Result Value Ref Range    TSH 0.64 0.30 - 5.00 uIU/mL   CBC with platelets and differential   Result Value Ref Range    WBC Count 5.4 4.0 - 11.0 10e3/uL    RBC Count 4.82 4.40 - 5.90 10e6/uL    Hemoglobin 15.5 13.3  - 17.7 g/dL    Hematocrit 45.7 40.0 - 53.0 %    MCV 95 78 - 100 fL    MCH 32.2 26.5 - 33.0 pg    MCHC 33.9 31.5 - 36.5 g/dL    RDW 13.1 10.0 - 15.0 %    Platelet Count 264 150 - 450 10e3/uL    % Neutrophils 65 %    % Lymphocytes 29 %    % Monocytes 4 %    % Eosinophils 1 %    % Basophils 1 %    % Immature Granulocytes 0 %    NRBCs per 100 WBC 0 <1 /100    Absolute Neutrophils 3.5 1.6 - 8.3 10e3/uL    Absolute Lymphocytes 1.6 0.8 - 5.3 10e3/uL    Absolute Monocytes 0.2 0.0 - 1.3 10e3/uL    Absolute Eosinophils 0.1 0.0 - 0.7 10e3/uL    Absolute Basophils 0.0 0.0 - 0.2 10e3/uL    Absolute Immature Granulocytes 0.0 <=0.4 10e3/uL    Absolute NRBCs 0.0 10e3/uL       RADIOLOGY:  No orders to display          EKG:    Sinus rhythm, no acute ischemic changes, no concerning dysrhythmias or for prolongation, and compared to ECG of March 13, 2022, no acute ischemic changes appreciated  I have independently reviewed and interpreted the EKG(s) documented above.        I, Yancy Zhu, am serving as a scribe to document services personally performed by Willard Chance MD, based on my observation and the provider's statements to me. I, Willard Chance MD attest that Yancy Zhu is acting in a scribe capacity, has observed my performance of the services and has documented them in accordance with my direction.    Willard Chance M.D.  Emergency Medicine  [facility] EMERGENCY DEPARTMENT  [facility] [facility] [address]  [phone]  Dept: [phone]     Willard Chance MD  03/15/22 2011

## 2022-03-15 NOTE — ED NOTES
Patient is calm and cooperative laying in bed. Pt asked to have  out of jacket pocket.  was retrieved. Inside patients jacket was liter, car keys, cigarettes, black  and a pile of credit cards with a rubber band on it.

## 2022-03-15 NOTE — ED PROVIDER NOTES
EMERGENCY DEPARTMENT SIGN OUT NOTE        ED COURSE AND MEDICAL DECISION MAKING  Patient was signed out to me by Dr Willard Chance at 4:30 PM.    In brief, Mick Haq is a 36 year old male who initially presented for palpitations and alcohol intoxication. Plan to discharge to detox or with a sober chaperone after road test.    At time of sign out, disposition was pending reassessment and successful road test once patient is clinically sober.     Patient evaluated a couple of hours after signout.  He appears to be clinically sober at this time, is fully ambulatory, tolerating p.o.  He is going to have a family member come and get him.  He had no events on cardiac monitoring here in the remainder of his labs and evaluation for palpitations all look stable and reassuring.  We discussed alcohol cessation and clinic follow-up.  Patient was agreeable.  Discharged in stable condition.    FINAL IMPRESSION    1. Alcoholic intoxication without complication (H)    2. Palpitations        ED MEDS  Medications   0.9% sodium chloride BOLUS (1,000 mLs Intravenous New Bag 3/15/22 1532)       LAB  Labs Ordered and Resulted from Time of ED Arrival to Time of ED Departure   ETHYL ALCOHOL LEVEL - Abnormal       Result Value    Alcohol, Blood 250 (*)    COMPREHENSIVE METABOLIC PANEL - Normal    Sodium 145      Potassium 4.3      Chloride 107      Carbon Dioxide (CO2) 23      Anion Gap 15      Urea Nitrogen 9      Creatinine 0.82      Calcium 8.5      Glucose 84      Alkaline Phosphatase 67      AST 24      ALT 18      Protein Total 7.4      Albumin 4.1      Bilirubin Total 0.2      GFR Estimate >90     TROPONIN I - Normal    Troponin I <0.01     TSH WITH FREE T4 REFLEX - Normal    TSH 0.64     CBC WITH PLATELETS AND DIFFERENTIAL    WBC Count 5.4      RBC Count 4.82      Hemoglobin 15.5      Hematocrit 45.7      MCV 95      MCH 32.2      MCHC 33.9      RDW 13.1      Platelet Count 264      % Neutrophils 65      % Lymphocytes 29       % Monocytes 4      % Eosinophils 1      % Basophils 1      % Immature Granulocytes 0      NRBCs per 100 WBC 0      Absolute Neutrophils 3.5      Absolute Lymphocytes 1.6      Absolute Monocytes 0.2      Absolute Eosinophils 0.1      Absolute Basophils 0.0      Absolute Immature Granulocytes 0.0      Absolute NRBCs 0.0         EKG  See initial provider note    RADIOLOGY    No orders to display       DISCHARGE MEDS  New Prescriptions    No medications on file         Ambrosio Peace MD  Emergency Medicine  [facility] EMERGENCY DEPARTMENT  [address] [place] [id number] [phone]     Tereso Peace MD  03/15/22 3815

## 2022-03-16 ENCOUNTER — PATIENT OUTREACH (OUTPATIENT)
Dept: CARE COORDINATION | Facility: CLINIC | Age: 37
End: 2022-03-16

## 2022-03-16 DIAGNOSIS — Z71.89 OTHER SPECIFIED COUNSELING: ICD-10-CM

## 2022-03-16 LAB
ATRIAL RATE - MUSE: 66 BPM
ATRIAL RATE - MUSE: 91 BPM
DIASTOLIC BLOOD PRESSURE - MUSE: 64 MMHG
DIASTOLIC BLOOD PRESSURE - MUSE: NORMAL MMHG
INTERPRETATION ECG - MUSE: NORMAL
INTERPRETATION ECG - MUSE: NORMAL
P AXIS - MUSE: 51 DEGREES
P AXIS - MUSE: 74 DEGREES
PR INTERVAL - MUSE: 112 MS
PR INTERVAL - MUSE: 124 MS
QRS DURATION - MUSE: 82 MS
QRS DURATION - MUSE: 84 MS
QT - MUSE: 356 MS
QT - MUSE: 392 MS
QTC - MUSE: 410 MS
QTC - MUSE: 437 MS
R AXIS - MUSE: 77 DEGREES
R AXIS - MUSE: 88 DEGREES
SYSTOLIC BLOOD PRESSURE - MUSE: 121 MMHG
SYSTOLIC BLOOD PRESSURE - MUSE: NORMAL MMHG
T AXIS - MUSE: 56 DEGREES
T AXIS - MUSE: 67 DEGREES
VENTRICULAR RATE- MUSE: 66 BPM
VENTRICULAR RATE- MUSE: 91 BPM

## 2022-03-16 NOTE — PROGRESS NOTES
"Clinic Care Coordination Contact  [facility]: Post-Discharge Note  SITUATION                                                      Admission:    Admission Date: 03/15/22   Reason for Admission: Alcohol intoxication without complication, Palpitations  Discharge:   Discharge Date: 03/15/22  Discharge Diagnosis: Alcohol intoxication without complication, Palpitations    BACKGROUND                                                      Per hospital discharge summary and inpatient provider notes:    \"Mick Haq is a 36 year old male with a history of anxiety, multi-substance dependence, and nondependent alcohol abuse, who presents with palpitations.     Patient reports his heart has been racing since 0830 today, he had woken up 10 minutes prior and had not been exercising. He denies any other symptoms like diaphoresis, vomiting, abdominal pain, or bowel or bladder changes. Patient endorses excessive amounts of alcohol daily, last intake was between 2119-4021 today. He endorses tobacco and marijuana use. Patient denies any recent falls or head trauma. He denies thoughts of hurting self or others. He reports his heart is still racing faster than it should be, but not as fast as earlier. Patient states he was here a week ago and they wanted to put him on a Holter monitor. He endorses he generally has a lot of anxiety, but would not like medication for it at the moment. Patient denies any other current complaints.\"    ASSESSMENT      Enrollment  Primary Care Care Coordination Status: Not a Candidate    Discharge Assessment  How are you doing now that you are home?: Pt states he's doing much better, does have \"some mild alcohol withdrawal sx with sweating and pins/needles feeling in my feet, which also goes hand in hand with anxiety; other than that it's not terrible.\"  [facility], and has AA near him he can attend in the meantime for support, while " waiting to get into treatment.  How are your symptoms? (Red Flag symptoms escalate to triage hotline per guidelines): Improved  Do you feel your condition is stable enough to be safe at home until your provider visit?: Yes  Does the patient have their discharge instructions? : Yes  Does the patient have questions regarding their discharge instructions? : No  Were you started on any new medications or were there changes to any of your previous medications? : No  Discharge follow-up appointment scheduled within 14 calendar days? : No  Is patient agreeable to assistance with scheduling? : No (Pt states [facility] will [facility] to [facility] care visit with a PCP ASAP, and will also work on getting into their treatment program through the [facility] as well.)         Post-op (Clinicians Only)  Did the patient have surgery or a procedure: No  Eating & Drinking: eating and drinking without complaints/concerns  Additional Symptoms:  (Pt denies)        PLAN                                                      Outpatient Plan:      Follow-up with a primary doctor after this ED visit and review any ongoing symptoms as soon as possible, alcohol cessation.        No future appointments.      For any urgent concerns, please contact our 24 hour nurse triage line: [phone] ([phone])         Jackie Schrader RN

## 2022-03-20 ENCOUNTER — HOSPITAL ENCOUNTER (EMERGENCY)
Facility: HOSPITAL | Age: 37
Discharge: HOME | End: 2022-03-20
Admitting: PHYSICIAN ASSISTANT
Payer: COMMERCIAL

## 2022-03-20 VITALS
OXYGEN SATURATION: 98 % | HEART RATE: 90 BPM | DIASTOLIC BLOOD PRESSURE: 60 MMHG | SYSTOLIC BLOOD PRESSURE: 110 MMHG | BODY MASS INDEX: 24.33 KG/M2 | HEIGHT: 67 IN | WEIGHT: 155 LBS | RESPIRATION RATE: 20 BRPM | TEMPERATURE: 98.1 F

## 2022-03-20 DIAGNOSIS — F10.20 ALCOHOL DEPENDENCE (H): ICD-10-CM

## 2022-03-20 DIAGNOSIS — F41.9 ANXIETY: ICD-10-CM

## 2022-03-20 DIAGNOSIS — R00.2 PALPITATIONS: ICD-10-CM

## 2022-03-20 LAB
ANION GAP SERPL CALCULATED.3IONS-SCNC: 15 MMOL/L (ref 5–18)
BUN SERPL-MCNC: 12 MG/DL (ref 8–22)
CALCIUM SERPL-MCNC: 9.1 MG/DL (ref 8.5–10.5)
CHLORIDE BLD-SCNC: 106 MMOL/L (ref 98–107)
CO2 SERPL-SCNC: 24 MMOL/L (ref 22–31)
CREAT SERPL-MCNC: 0.79 MG/DL (ref 0.7–1.3)
ERYTHROCYTE [DISTWIDTH] IN BLOOD BY AUTOMATED COUNT: 13.2 % (ref 10–15)
ETHANOL SERPL-MCNC: 112 MG/DL
GFR SERPL CREATININE-BSD FRML MDRD: >90 ML/MIN/1.73M2
GLUCOSE BLD-MCNC: 76 MG/DL (ref 70–125)
HCT VFR BLD AUTO: 47.4 % (ref 40–53)
HGB BLD-MCNC: 15.9 G/DL (ref 13.3–17.7)
HOLD SPECIMEN: NORMAL
MAGNESIUM SERPL-MCNC: 2.2 MG/DL (ref 1.8–2.6)
MCH RBC QN AUTO: 32 PG (ref 26.5–33)
MCHC RBC AUTO-ENTMCNC: 33.5 G/DL (ref 31.5–36.5)
MCV RBC AUTO: 95 FL (ref 78–100)
PLATELET # BLD AUTO: 286 10E3/UL (ref 150–450)
POTASSIUM BLD-SCNC: 3.8 MMOL/L (ref 3.5–5)
RBC # BLD AUTO: 4.97 10E6/UL (ref 4.4–5.9)
SODIUM SERPL-SCNC: 145 MMOL/L (ref 136–145)
TROPONIN I SERPL-MCNC: <0.01 NG/ML (ref 0–0.29)
WBC # BLD AUTO: 6 10E3/UL (ref 4–11)

## 2022-03-20 PROCEDURE — 85027 COMPLETE CBC AUTOMATED: CPT | Performed by: PHYSICIAN ASSISTANT

## 2022-03-20 PROCEDURE — 96374 THER/PROPH/DIAG INJ IV PUSH: CPT

## 2022-03-20 PROCEDURE — 82077 ASSAY SPEC XCP UR&BREATH IA: CPT | Performed by: PHYSICIAN ASSISTANT

## 2022-03-20 PROCEDURE — 96361 HYDRATE IV INFUSION ADD-ON: CPT

## 2022-03-20 PROCEDURE — 250N000011 HC RX IP 250 OP 636: Performed by: PHYSICIAN ASSISTANT

## 2022-03-20 PROCEDURE — 83735 ASSAY OF MAGNESIUM: CPT | Performed by: PHYSICIAN ASSISTANT

## 2022-03-20 PROCEDURE — 80048 BASIC METABOLIC PNL TOTAL CA: CPT | Performed by: PHYSICIAN ASSISTANT

## 2022-03-20 PROCEDURE — 36415 COLL VENOUS BLD VENIPUNCTURE: CPT | Performed by: PHYSICIAN ASSISTANT

## 2022-03-20 PROCEDURE — 258N000003 HC RX IP 258 OP 636: Performed by: PHYSICIAN ASSISTANT

## 2022-03-20 PROCEDURE — 93005 ELECTROCARDIOGRAM TRACING: CPT | Performed by: PHYSICIAN ASSISTANT

## 2022-03-20 PROCEDURE — 99285 EMERGENCY DEPT VISIT HI MDM: CPT | Mod: 25

## 2022-03-20 PROCEDURE — 84484 ASSAY OF TROPONIN QUANT: CPT | Performed by: PHYSICIAN ASSISTANT

## 2022-03-20 RX ORDER — DIAZEPAM 10 MG/2ML
5 INJECTION, SOLUTION INTRAMUSCULAR; INTRAVENOUS ONCE
Status: COMPLETED | OUTPATIENT
Start: 2022-03-20 | End: 2022-03-20

## 2022-03-20 RX ORDER — HYDROXYZINE HYDROCHLORIDE 25 MG/1
25 TABLET, FILM COATED ORAL 3 TIMES DAILY PRN
Qty: 10 TABLET | Refills: 0 | Status: SHIPPED | OUTPATIENT
Start: 2022-03-20

## 2022-03-20 RX ADMIN — SODIUM CHLORIDE 1000 ML: 9 INJECTION, SOLUTION INTRAVENOUS at 09:02

## 2022-03-20 RX ADMIN — DIAZEPAM 5 MG: 5 INJECTION, SOLUTION INTRAMUSCULAR; INTRAVENOUS at 09:10

## 2022-03-20 ASSESSMENT — ENCOUNTER SYMPTOMS
ABDOMINAL PAIN: 0
VOMITING: 0
NAUSEA: 0
SHORTNESS OF BREATH: 0
HEADACHES: 0
FEVER: 0

## 2022-03-20 NOTE — DISCHARGE INSTRUCTIONS
Continue with your efforts for alcohol cessation.  Make sure you are staying hydrated, drinking plenty of water and Pedialyte.  I am prescribing hydroxyzine as needed for anxiety.    [facility].  Return to the emergency department if you develop any new or worsening symptoms.  Would be happy to see you.    *[place] [facility]  (type CD intake in the search field)  [email or web address]  [phone]   inpatient services [phone]  or [phone] To arrange an appointment with CD counselor   [facility]  [email or web address]  [phone] Hours vary, call for information  Rule 25 assessments  Counseling & assessments  For CD & outpatient treatment   [place]  [facility])  http://[facility].org   [phone] [address], [facility]  Residential drug and alcohol programs serving teens and adults from all ethnic, socioeconomic and Catholic backgrounds       [facility]                                     [phone]                        [place] and [facility] site  [email or web address].[email or web address]

## 2022-03-20 NOTE — ED TRIAGE NOTES
"Patient state that he woke this am feeling his heart was racing.  This has been happening off and on the past few weeks. Was seen here for it but has not followed up with cardiology yet. He states \"it is probably a result of the fact I have smoked and drink too much alcohol for too many years\".  No chest pain, feeling dizzy. Last drink was 1700 yesterday and he says \"I drank way too much yesterday\".   "

## 2022-03-20 NOTE — ED PROVIDER NOTES
EMERGENCY DEPARTMENT ENCOUNTER      NAME: Mick Haq  AGE: 36 year old male  YOB: 1985  MRN: [id number]  EVALUATION DATE & TIME: No admission date for patient encounter.    PCP: No Ref-Primary, Physician    ED PROVIDER: Nan Sweeney PA-C      Chief Complaint   Patient presents with     Palpitations         FINAL IMPRESSION:  1. Palpitations    2. Alcohol dependence (H)    3. Anxiety          ED COURSE & MEDICAL DECISION MAKIN:48 AM I introduced myself to patient, performed initial HPI and examination.   10:12 AM Rechecked patient. Discussed plan for discharge.       36 year old male with PMH bipolar, polysubstance abuse, alcohol dependence presents to the Emergency Department for evaluation of palpitations.    Patient has been seen by me twice for similar symptoms, and once more by another provider.  Work-ups thus far have been unremarkable.  Recommended follow-up with cardiology, however patient [facility].  Patient plans to attend treatment for his alcohol dependence, but [facility] for this as well.  He is now considering residential treatment.    Upon arrival, patient is mildly tachycardic with heart rate in the 110s.  On examination, patient has normal HR and HR remains normal for the rest of his stay.  Vital signs and examination is otherwise unremarkable.  No overt signs of withdrawal, patient reports last alcoholic beverage at 5 PM last night and does feel like his palpitations, some fidgeting, and chest tightness are probably early withdrawal symptoms.  Does have a history of anxiety and feels like this is increased right now.  Does not take anything at home other than marijuana and alcohol for self-medicating.    EKG nonischemic, normal heart rate.  No arrhythmia.  Labs including CBC, BMP, magnesium, and troponin are negative.  Recent TSH testing is normal.  EtOH 112    Patient was given IV fluids and small dose of Valium with  improvement of symptoms.  No evidence of significant withdrawal symptoms. No catastrophic etiology identified today. VSS. Patient is appropriate for discharge. Patient was offered detox for further withdrawal management, patient declines.  Will discharge with a small prescription for hydroxyzine.  instructed on at home management, close follow-up with PCP and [facility] for [facility] and discussion of residential treatment for his chemical dependency.    Instructed on at home management and red flag/indications to return to the emergency department.  All questions were answered the best my ability and patient is agreeable with plan.  See discharge summary.       MEDICATIONS GIVEN IN THE EMERGENCY:  Medications   0.9% sodium chloride BOLUS (0 mLs Intravenous Stopped 3/20/22 1014)   diazepam (VALIUM) injection 5 mg (5 mg Intravenous Given 3/20/22 0910)       NEW PRESCRIPTIONS STARTED AT TODAY'S ER VISIT  [unfilled]       =================================================================    HPI    Patient information was obtained from: Patient    Use of : N/A        Mick Haq is a 36 year old male with a pertinent history of bipolar, alcohol dependence, polysubstance use who presents to this ED for evaluation of palpitations.  Patient has been seen in this ED 3 times previously, twice by me for similar symptoms.  Reports today's episode is similar to prior with heart rate between 110-150s.  Does endorse persistent alcohol use with last drink somewhere around 5 or 6 PM last night.  Patient does wish to achieve sobriety.  Does feel like he may be in some mild withdrawal.  Reports he feels anxious, fidgety, and suspects his palpitations are related to withdrawal as well.    Initially signed up for [facility] but has not started.  [facility]  Previously was offered cardiology follow-up, but again wanted to have this done through the [facility].  He has  "not reached out to them to set up any follow-up yet.  Patient states he is interested in residential treatment for his alcohol dependence.     Reports history of anxiety which he typically self medicates with alcohol and marijuana.       REVIEW OF SYSTEMS   Review of Systems   Constitutional: Negative for fever.   Respiratory: Negative for shortness of breath.    Cardiovascular: Positive for chest pain.   Gastrointestinal: Negative for abdominal pain, nausea and vomiting.   Skin: Negative for rash.   Neurological: Negative for headaches.   All other systems reviewed and are negative.       PAST MEDICAL HISTORY:  No past medical history on file.    PAST SURGICAL HISTORY:  No past surgical history on file.    CURRENT MEDICATIONS:    hydrOXYzine (ATARAX) 25 MG tablet        ALLERGIES:  Allergies   Allergen Reactions     Lactose        FAMILY HISTORY:  No family history on file.    SOCIAL HISTORY:   Social History     Socioeconomic History     Marital status: Single     Spouse name: Not on file     Number of children: Not on file     Years of education: Not on file     Highest education level: Not on file   Occupational History     Not on file   Tobacco Use     Smoking status: Current Every Day Smoker     Smokeless tobacco: Not on file   Substance and Sexual Activity     Alcohol use: Yes     Drug use: Yes     Types: Marijuana     Sexual activity: Not on file   Other Topics Concern     Not on file   Social History Narrative     Not on file     Social Determinants of Health     Financial Resource Strain: Not on file   Food Insecurity: Not on file   Transportation Needs: Not on file   Physical Activity: Not on file   Stress: Not on file   Social Connections: Not on file   Intimate Partner Violence: Not on file   Housing Stability: Not on file       VITALS:  /60   Pulse 90   Temp 98.1  F (36.7  C) (Temporal)   Resp 20   Ht 1.702 m (5' 7\")   Wt 70.3 kg (155 lb)   SpO2 98%   BMI 24.28 kg/m      PHYSICAL EXAM  "   Constitutional: Well developed, Well nourished, NAD, GCS 15   HENT: Normocephalic, Atraumatic, Oropharynx clear.   Neck- Supple, Nontender. Normal ROM.  Eyes: Conjunctiva normal. PERRL. EOM intact.   Respiratory: No respiratory distress, speaking in full sentences. Normal breath sounds, No wheezing  Cardiovascular: Normal heart rate, Regular rhythm, No murmurs.    GI: Soft, nontender.    Musculoskeletal: No deformities, Moves all extremities equally. No calf tenderness or swelling.   Integument: Warm, Dry, No erythema, ecchymosis, or rash.  Neurologic: Alert & oriented x 3, Normal sensory function. No focal deficits.   Psychiatric: Affect normal, Judgment normal, Mood normal. Cooperative.      LAB:  All pertinent labs reviewed and interpreted.  Results for orders placed or performed during the hospital encounter of 03/20/22   Extra Blue Top Tube   Result Value Ref Range    Hold Specimen JIC    Extra Red Top Tube   Result Value Ref Range    Hold Specimen JIC    Extra Green Top (Lithium Heparin) Tube   Result Value Ref Range    Hold Specimen JIC    Extra Purple Top Tube   Result Value Ref Range    Hold Specimen JIC    CBC (+ platelets, no diff)   Result Value Ref Range    WBC Count 6.0 4.0 - 11.0 10e3/uL    RBC Count 4.97 4.40 - 5.90 10e6/uL    Hemoglobin 15.9 13.3 - 17.7 g/dL    Hematocrit 47.4 40.0 - 53.0 %    MCV 95 78 - 100 fL    MCH 32.0 26.5 - 33.0 pg    MCHC 33.5 31.5 - 36.5 g/dL    RDW 13.2 10.0 - 15.0 %    Platelet Count 286 150 - 450 10e3/uL   Basic metabolic panel   Result Value Ref Range    Sodium 145 136 - 145 mmol/L    Potassium 3.8 3.5 - 5.0 mmol/L    Chloride 106 98 - 107 mmol/L    Carbon Dioxide (CO2) 24 22 - 31 mmol/L    Anion Gap 15 5 - 18 mmol/L    Urea Nitrogen 12 8 - 22 mg/dL    Creatinine 0.79 0.70 - 1.30 mg/dL    Calcium 9.1 8.5 - 10.5 mg/dL    Glucose 76 70 - 125 mg/dL    GFR Estimate >90 >60 mL/min/1.73m2   Result Value Ref Range    Magnesium 2.2 1.8 - 2.6 mg/dL   Troponin I (now)   Result  Value Ref Range    Troponin I <0.01 0.00 - 0.29 ng/mL   Alcohol level blood   Result Value Ref Range    Alcohol, Blood 112 (H) None detected mg/dL       RADIOLOGY:  Reviewed all pertinent imaging. Please see official radiology report.  No orders to display       EKG:    Performed at: 20-Mar-2022 09:17:45    Impression: Sinus rhythm with sinus arrhythmia, Normal ECG    Rate: 72 BPM  CO Interval: 120 ms  QRS Interval: 86 ms  QTc Interval: 388/424 ms  ST Changes: None  Comparison: When compared with ECG of 15-Mar-2022 15:52, No significant change was found.    Dr. Whitney and I have independently reviewed and interpreted the EKG(s) documented above.    PROCEDURES:   None      Nan Sweeney PA-C  Emergency Medicine  [facility] EMERGENCY DEPARTMENT  [facility] [facility] [address]  [phone]             Nan Sweeney PA-C  03/20/22 1136

## 2022-03-21 LAB
ATRIAL RATE - MUSE: 72 BPM
DIASTOLIC BLOOD PRESSURE - MUSE: NORMAL MMHG
INTERPRETATION ECG - MUSE: NORMAL
P AXIS - MUSE: 58 DEGREES
PR INTERVAL - MUSE: 120 MS
QRS DURATION - MUSE: 86 MS
QT - MUSE: 388 MS
QTC - MUSE: 424 MS
R AXIS - MUSE: 76 DEGREES
SYSTOLIC BLOOD PRESSURE - MUSE: NORMAL MMHG
T AXIS - MUSE: 50 DEGREES
VENTRICULAR RATE- MUSE: 72 BPM

## 2022-04-09 ENCOUNTER — HOSPITAL ENCOUNTER (EMERGENCY)
Facility: HOSPITAL | Age: 37
Discharge: HOME | End: 2022-04-10
Attending: EMERGENCY MEDICINE | Admitting: EMERGENCY MEDICINE
Payer: COMMERCIAL

## 2022-04-09 DIAGNOSIS — F10.920 ALCOHOLIC INTOXICATION WITHOUT COMPLICATION (H): ICD-10-CM

## 2022-04-09 DIAGNOSIS — R00.2 PALPITATIONS: ICD-10-CM

## 2022-04-09 PROCEDURE — 96360 HYDRATION IV INFUSION INIT: CPT

## 2022-04-09 PROCEDURE — 99284 EMERGENCY DEPT VISIT MOD MDM: CPT | Mod: 25

## 2022-04-09 RX ORDER — SODIUM CHLORIDE 9 MG/ML
1000 INJECTION, SOLUTION INTRAVENOUS CONTINUOUS
Status: DISCONTINUED | OUTPATIENT
Start: 2022-04-10 | End: 2022-04-10 | Stop reason: HOSPADM

## 2022-04-09 RX ORDER — HYDROXYZINE HYDROCHLORIDE 25 MG/1
25 TABLET, FILM COATED ORAL ONCE
Status: COMPLETED | OUTPATIENT
Start: 2022-04-10 | End: 2022-04-10

## 2022-04-09 ASSESSMENT — ENCOUNTER SYMPTOMS
VOMITING: 0
ABDOMINAL PAIN: 0
PALPITATIONS: 1
NAUSEA: 0

## 2022-04-10 VITALS
DIASTOLIC BLOOD PRESSURE: 80 MMHG | TEMPERATURE: 98 F | RESPIRATION RATE: 28 BRPM | OXYGEN SATURATION: 99 % | HEART RATE: 104 BPM | SYSTOLIC BLOOD PRESSURE: 122 MMHG

## 2022-04-10 LAB
ATRIAL RATE - MUSE: 107 BPM
DIASTOLIC BLOOD PRESSURE - MUSE: 71 MMHG
ETHANOL SERPL-MCNC: 289 MG/DL
INTERPRETATION ECG - MUSE: NORMAL
P AXIS - MUSE: 76 DEGREES
PR INTERVAL - MUSE: 140 MS
QRS DURATION - MUSE: 82 MS
QT - MUSE: 346 MS
QTC - MUSE: 461 MS
R AXIS - MUSE: 81 DEGREES
SYSTOLIC BLOOD PRESSURE - MUSE: 117 MMHG
T AXIS - MUSE: 53 DEGREES
VENTRICULAR RATE- MUSE: 107 BPM

## 2022-04-10 PROCEDURE — 82077 ASSAY SPEC XCP UR&BREATH IA: CPT | Performed by: EMERGENCY MEDICINE

## 2022-04-10 PROCEDURE — 250N000013 HC RX MED GY IP 250 OP 250 PS 637: Performed by: EMERGENCY MEDICINE

## 2022-04-10 PROCEDURE — 258N000003 HC RX IP 258 OP 636: Performed by: EMERGENCY MEDICINE

## 2022-04-10 PROCEDURE — 93005 ELECTROCARDIOGRAM TRACING: CPT | Performed by: EMERGENCY MEDICINE

## 2022-04-10 PROCEDURE — 36415 COLL VENOUS BLD VENIPUNCTURE: CPT | Performed by: EMERGENCY MEDICINE

## 2022-04-10 RX ADMIN — HYDROXYZINE HYDROCHLORIDE 25 MG: 25 TABLET ORAL at 00:19

## 2022-04-10 RX ADMIN — SODIUM CHLORIDE 1000 ML: 9 INJECTION, SOLUTION INTRAVENOUS at 00:56

## 2022-04-10 RX ADMIN — SODIUM CHLORIDE 1000 ML: 9 INJECTION, SOLUTION INTRAVENOUS at 00:10

## 2022-04-10 NOTE — ED NOTES
Bed: JNED-22  Expected date: 4/9/22  Expected time: 11:28 PM  Means of arrival: Ambulance  Comments:  [place] 36M ETOH, anxiety

## 2022-04-10 NOTE — DISCHARGE INSTRUCTIONS
If you are concerned about your elevated heart rate, please follow-up with cardiology at the [facility] to discuss additional testing.    Given the amount of alcohol that you use, would be recommended for you to reduce consumption and enter a treatment program if unable to do so.    If you are unable to manage her symptoms at home or you develop vomiting where you are unable to tolerate oral hydration, significant abdominal pain that is not responsive to over-the-counter medications, or fever greater than 100.5 degrees, please return to the emergency department for evaluation.

## 2022-04-10 NOTE — ED NOTES
"Mick is calmer than when he arrived. He is laying quietly in bed with his legs crossed. He reports feeling \"a little better\".  "

## 2022-04-10 NOTE — ED NOTES
Pt has pulled his cardiac monitor leads off. He asks is we took a blood etoh level. He finds it funny when I answer yes and tell him what the level is.

## 2022-04-10 NOTE — ED PROVIDER NOTES
"EMERGENCY DEPARTMENT ENCOUNTER      NAME: Mick Haq  AGE: 36 year old male  YOB: 1985  MRN: [id number]  EVALUATION DATE & TIME: 2022 11:35 PM    PCP: No Ref-Primary, Physician    ED PROVIDER: Key Arnett M.D.      Chief Complaint   Patient presents with     Palpitations         FINAL IMPRESSION:  1. Alcoholic intoxication without complication (H)    2. Palpitations        MEDICAL DECISION MAKIN:47 PM I met with the patient, obtained history, performed an initial exam, and discussed options and plan for diagnostics and treatment here in the ED.   1:38 AM I rechecked and updated patient on results. We discussed the plan for discharge and the patient is agreeable. Reviewed supportive cares, symptomatic treatment, outpatient follow up, and reasons to return to the Emergency Department. Patient to be discharged by ED RN.   Pertinent Labs & Imaging studies reviewed. (See chart for details)     Mick Haq is a 36 year old male who presents with alcohol intoxication and tachycardia.  The patient is frequently seen for this complaint and has been referred to cardiology several times.  However, [facility] but has not [facility] cardiology consult there.  ECG done on arrival shows sinus tachycardia without any concerning arrhythmias or ST changes.  Admits to drinking copious amounts of alcohol, does this on a daily basis and has not recently sought treatment.  Jokes about this.  His exam is benign.  I suppose suspect mild dehydration with uncomplicated alcohol intoxication.  Aside from mild tachycardia with heart rate 109 his other vital signs are normal and there is no sign of withdrawal.  He will get a small fluid bolus and some Atarax for \"anxiety.\"  He does request Ativan not for withdrawal but because it \"feels good.\" No suicidal ideation.    Patient is feeling significantly improved after the fluids and Atarax.  He has had a myriad of work-up " "regarding his palpitations and I see nothing concerning on the ECG to warrant additional labs tonight.  He was specifically instructed multiple times to [facility].  I encouraged him to do so again.  I do feel his tachycardia, since improved with fluids, is related to his alcohol use.  We discussed warning signs and indications to return to the emergency department.  He understands these warning signs and will return with any concerns.         MEDICATIONS GIVEN IN THE EMERGENCY:  Medications   sodium chloride 0.9% infusion (0 mLs Intravenous Stopped 4/10/22 0136)   0.9% sodium chloride BOLUS (0 mLs Intravenous Stopped 4/10/22 0055)   hydrOXYzine (ATARAX) tablet 25 mg (25 mg Oral Given 4/10/22 0019)         =================================================================    HPI    Patient information was obtained from: Patient    Use of : N/A       Mick HERRING Eun is a 36 year old male with a history of mood disorder, bipolar disorder, nondependent alcohol abuse, cannabis abuse, substance abuse, amphetamine and psychostimulant dependence, nicotine dependence, opioid dependence, anxiolytic dependence, cocaine dependence, heroin dependence in remission, who presents via EMS with palpitations and alcohol intoxication..    Patient reports palpitations that started this evening. He states he has had difficulties controlling his heart rate as it has been between 130 and 180 BPM. Patient denies any recent strenuous activity today. Patient does admit to alcohol use tonight, about eight shots and four beers. He states he has been drinking every night for the last seven years. When asked if patient had sought treatment, he reports he has \"been working on it.\" Patient also reports that he was seen a month ago and was given a referral to follow up with cardiology, but notes he has not followed up. [facility] and denies any difficulties with seeing a cardiologist, he " just has not tried. Otherwise, he denies any nausea, vomiting, abdominal pain, and leg swelling. He states he has not been active for the last six months. Patient is currently living in [place]. No other complaints at this time.    Per chart review, patient was seen at [facility] Emergency Department on 3/20/22 for palpitations. EKG nonischemic, normal heart rate.  No arrhythmia. Labs including CBC, BMP, magnesium, and troponin are negative.  Recent TSH testing is normal.  EtOH 112. Patient was given IV fluids and small dose of Valium with improvement of symptoms.  No evidence of significant withdrawal symptoms. No catastrophic etiology identified today. VSS. Patient was offered detox for further withdrawal management, patient declines. Discharged with a small prescription for hydroxyzine.  Instructed on at home management, close follow-up with PCP and [facility] and discussion of residential treatment for his chemical dependency. Recommended follow-up with cardiology, however patient preferred [facility].  Patient plans to attend treatment for his alcohol dependence, but [facility] as well.  He is now considering residential treatment.     REVIEW OF SYSTEMS   Review of Systems   Cardiovascular: Positive for palpitations. Negative for leg swelling.   Gastrointestinal: Negative for abdominal pain, nausea and vomiting.   All other systems reviewed and are negative.     PAST MEDICAL HISTORY:  History reviewed. No pertinent past medical history.    PAST SURGICAL HISTORY:  History reviewed. No pertinent surgical history.    CURRENT MEDICATIONS:      Current Facility-Administered Medications:      sodium chloride 0.9% infusion, 1,000 mL, Intravenous, Continuous, Key Arnett MD, Stopped at 04/10/22 0136    Current Outpatient Medications:      hydrOXYzine (ATARAX) 25 MG tablet, Take 1 tablet (25 mg) by mouth 3 times daily as needed  for anxiety, Disp: 10 tablet, Rfl: 0    ALLERGIES:  Allergies   Allergen Reactions     Lactose        FAMILY HISTORY:  History reviewed. No pertinent family history.    SOCIAL HISTORY:   Social History     Tobacco Use     Smoking status: Current Every Day Smoker     Smokeless tobacco: None   Substance Use Topics     Alcohol use: Yes     Drug use: Yes     Types: Marijuana        PHYSICAL EXAM:    Vitals: /80   Pulse 104   Temp 98  F (36.7  C) (Oral)   Resp 28   SpO2 99%    General:. Alert and interactive, comfortable appearing. Grossly intoxicated.  HENT: Oropharynx without erythema or exudates. MMM.  TMs clear bilaterally.  Eyes: Pupils mid-sized and equally reactive.   Neck: Full AROM.  No midline tenderness to palpation.  Cardiovascular: Tachycardic, Regular rhythm. Peripheral pulses 2+ bilaterally.  Chest/Pulmonary: Normal work of breathing. Lung sounds clear and equal throughout, no wheezes or crackles. No chest wall tenderness or deformities.  Abdomen: Soft, nondistended. Nontender without guarding or rebound.  Back/Spine: No CVA or midline tenderness.  Extremities: Normal ROM of all major joints. No lower extremity edema.   Skin: Warm and dry. Normal skin color.   Neuro: Speech slurred. CNs grossly intact. Moves all extremities appropriately. Strength and sensation grossly intact to all extremities.   Psych: Normal affect/mood, cooperative, memory appropriate. Somewhat avoidant with questioning.     LAB:  All pertinent labs reviewed and interpreted.  Labs Ordered and Resulted from Time of ED Arrival to Time of ED Departure   ETHYL ALCOHOL LEVEL - Abnormal       Result Value    Alcohol, Blood 289 (*)        EKG:   Performed at: [facility] [place]  Sinus tachycardia  No acute ST changes  No concerning intervals or arrhythmias  Increased ventricular rate with compared with ECG of 3-20-22 but no significant changes in T waves or intervals.  I have independently reviewed and interpreted the EKG(s) documented  above.       I, Oxana Hussein, am serving as a scribe to document services personally performed by Dr. Key Arnett  based on my observation and the provider's statements to me. I, Key Arnett MD attest that Oxana Hussein is acting in a scribe capacity, has observed my performance of the services and has documented them in accordance with my direction.      Key Arnett M.D.  Emergency Medicine  [facility] EMERGENCY DEPARTMENT  [address] [place] [id number]  [phone]  Dept: [phone]         Key Arnett MD  04/10/22 0148

## 2022-04-10 NOTE — ED TRIAGE NOTES
Pt has been drinking etoh this evening and has been suffering from anxiety and palpitations intermittently. Arrives via ambulance after he called them out to his placed of residence x 2. Pt is awake, alert, and is coherent.